# Patient Record
Sex: MALE | Race: OTHER
[De-identification: names, ages, dates, MRNs, and addresses within clinical notes are randomized per-mention and may not be internally consistent; named-entity substitution may affect disease eponyms.]

---

## 2019-04-16 ENCOUNTER — HOSPITAL ENCOUNTER (EMERGENCY)
Dept: HOSPITAL 72 - EMR | Age: 24
Discharge: HOME | End: 2019-04-16
Payer: SELF-PAY

## 2019-04-16 VITALS — BODY MASS INDEX: 21.76 KG/M2 | WEIGHT: 175 LBS | HEIGHT: 75 IN

## 2019-04-16 VITALS — DIASTOLIC BLOOD PRESSURE: 84 MMHG | SYSTOLIC BLOOD PRESSURE: 136 MMHG

## 2019-04-16 DIAGNOSIS — J20.9: Primary | ICD-10-CM

## 2019-04-16 DIAGNOSIS — Z88.8: ICD-10-CM

## 2019-04-16 PROCEDURE — 99282 EMERGENCY DEPT VISIT SF MDM: CPT

## 2019-04-16 NOTE — NUR
ED Nurse Note:





pt cleared to be d/c per ER provider, pt discharge and aftercare instruction 
provided w/ prescription, pt education done via discussion and handout, pt 
advised to follow up with pcp or return to ed if sx worsen or new sx develop, 
pt verbalized understanding and agrees with plan, vss, ambulatory w/steady 
gait, left w/ all belongings.

## 2019-04-16 NOTE — NUR
ED Nurse Note:



pt walked in c/o dry cough and chest pain when coughing x 2 wks. Pt AA&ox4, 
gcs=15, skin warm and dry, resp even and unlabored on RA, LS=clear, will cont 
monitor. no cough noted at this time.

## 2019-04-17 NOTE — EMERGENCY ROOM REPORT
History of Present Illness


General


Chief Complaint:  Upper Respiratory Illness


Source:  Patient





Present Illness


HPI


Patient is a 24-year-old male presented after increased cough.  Patient reports 

having increased nonproductive cough.  He reports having recent use of a Z-Jay.

  He states he had previously been told he was allergic to guaifenesin.  He 

reports having some prior reactions to dextromethorphan.  Patient states has 

been having increased cough worse at night.  He reports having recent blood 

test as well as a recent chest x-ray which was negative.  He denies being a 

smoker.


Allergies:  


Coded Allergies:  


     GUAIFENESIN (Verified  Allergy, Unknown, 4/16/19)


     IBUPROFEN (Verified  Allergy, Unknown, 4/16/19)





Patient History


Past Medical History:  see triage record


Reviewed Nursing Documentation:  PMH: Agreed; PSxH: Agreed





Nursing Documentation-PMH


Past Medical History:  No Stated History





Review of Systems


All Other Systems:  negative except mentioned in HPI





Physical Exam





Vital Signs








  Date Time  Temp Pulse Resp B/P (MAP) Pulse Ox O2 Delivery O2 Flow Rate FiO2


 


4/16/19 23:31 98.4 76 16 136/84 95 Room Air  








General Appearance:  well appearing, no apparent distress, alert, GCS 15


Head:  normocephalic, atraumatic


ENT:  hearing grossly normal, normal voice


Neck:  full range of motion, supple


Respiratory:  no respiratory distress, speaking full sentences, wheezing


Musculoskeletal:  no calf tenderness


Neurologic:  normal gait


Psychiatric:  mood/affect normal


Skin:  no rash





Medical Decision Making


Diagnostic Impression:  


 Primary Impression:  


 Bronchitis


ER Course


Patient presented for cough.  Differential diagnosis included but was not 

limited to bronchitis, pneumonia, pulmonary embolism, pericarditis, asthma, 

foreign body.  Patient has a benign exam and does not appear to require any 

further imaging or laboratory testing at this time.  Patient reports having 

allergy to guaifenesin initially.    Patient was given prescription for 

prednisone as well as chlorphenermine DM.  He subsequently stated he was 

allergic to dextromethorphan.  Patient was advised to use over-the-counter 

cough medications if that was the case and not to fill reduction with ORIF and 

DM containing cough medication.Patient's does not appear to have significant 

amount of coughing while in the emergency department.  He was given 

prescription for oral prednisone due to some bronchospasm.Patient specifically 

requested codeine-containing cough syrup.  I do not feel that this is indicated.





Last Vital Signs








  Date Time  Temp Pulse Resp B/P (MAP) Pulse Ox O2 Delivery O2 Flow Rate FiO2


 


4/16/19 23:57 98.4 76 16 136/84 95 Room Air  








Status:  improved


Disposition:  HOME, SELF-CARE


Condition:  Stable


Scripts


Chlorpheniramine/Dextromethorp (Scot-Tussin Dm Liquid) 118 Ml Liquid


118 ML PO EVERY 6 HOURS, #118 ML


   Prov: Champ Bauer MD         4/16/19 


Prednisone* (PREDNISONE*) 20 Mg Tablet


40 MG ORAL DAILY, #10 TAB


   Prov: Champ Bauer MD         4/16/19


Referrals:  


NOT CHOSEN [facility]/MD,REFERRING (PCP)


Patient Instructions:  Cough, Adult











Champ Bauer MD Apr 17, 2019 02:37